# Patient Record
Sex: MALE | Race: WHITE | Employment: OTHER | URBAN - METROPOLITAN AREA
[De-identification: names, ages, dates, MRNs, and addresses within clinical notes are randomized per-mention and may not be internally consistent; named-entity substitution may affect disease eponyms.]

---

## 2017-01-05 ENCOUNTER — OFFICE VISIT (OUTPATIENT)
Dept: CARDIOLOGY CLINIC | Age: 82
End: 2017-01-05

## 2017-01-05 VITALS
SYSTOLIC BLOOD PRESSURE: 120 MMHG | HEART RATE: 68 BPM | WEIGHT: 170 LBS | OXYGEN SATURATION: 96 % | RESPIRATION RATE: 16 BRPM | DIASTOLIC BLOOD PRESSURE: 70 MMHG | HEIGHT: 65 IN | BODY MASS INDEX: 28.32 KG/M2

## 2017-01-05 DIAGNOSIS — J96.90 RESPIRATORY FAILURE, UNSPECIFIED CHRONICITY, UNSPECIFIED WHETHER WITH HYPOXIA OR HYPERCAPNIA (HCC): ICD-10-CM

## 2017-01-05 DIAGNOSIS — I10 ESSENTIAL HYPERTENSION, BENIGN: Chronic | ICD-10-CM

## 2017-01-05 DIAGNOSIS — I34.0 NON-RHEUMATIC MITRAL REGURGITATION: ICD-10-CM

## 2017-01-05 DIAGNOSIS — I48.0 PAROXYSMAL ATRIAL FIBRILLATION (HCC): Primary | Chronic | ICD-10-CM

## 2017-01-05 NOTE — PATIENT INSTRUCTIONS
Keep follow up as scheduled in April    Your oxygen level was 96% on room air so you can stop your oxygen

## 2017-01-05 NOTE — PROGRESS NOTES
Ankush Em     6/3/1923       Miguel Monae MD, [facility] [place]  Date of Visit-1/5/2017   PCP is Laila Hidalgo MD   [facility] [place]  [facility]  HPI:  Ankush Em is a 80 y.o. male     Patient with PAF. Was hospitalized around Christmas with respiratory failure with COPD. Did not have overt CHF. BNP was 249. Labs from [facility] with sodium of 139 and BUN of 19. He states he has returned to baseline health since leaving hospital. BP and heart rate dairy from [facility] is stable and within normal limits. Denies chest pain, edema, syncope or shortness of breath at rest, has no tachycardia, palpitations or sense of arrhythmia. Mr. Mary Tapia returns today. He denies any symptoms currently, but we discussed his admission to [facility] on [place] day, which is why he is here early for follow up. I have reviewed the cardiology progress note of Dr. Tawanna Parsons dated 12/24/16, indicating hypoxic acute failure with O2 sats of 88-91%, thinking that this patient may have some element of emphysema and respiratory failure, but he did not feel it was CHF. The EF was 50% and the PA pressure was 45. Patient also has known PAF and was controlled on Amiodarone just only once a week and was sent back to [facility]. Currently blood pressure is 120. Patient feels well. Records will be sent to Dr. Giselle Guerra, to include [facility]. Blood work was obtained 01/02/17, indicating sodium 139, BUN 19. Additionally vital signs are reported indicating a weight around 168, blood pressure in the 120s, and that record will be scanned to chart.        Assessment/Plan:     Hospital stay he was started on lasix and Metformin  PAF  -seems to be in sinus   -continue amiodarone and ASA  -no OAC due to fall risk     Respiratory failure  -improved  -EF normal at 50%  -did not seem to be CHF  -will check oxygen saturation today off oxygen    HTN  -well controlled    Follow up as scheduled in April   Future Appointments  Date Time Provider Yudith Pittman   4/6/2017 10:00 AM Judy Rivera  [address]      Patient Instructions   Keep follow up as scheduled in April    Your oxygen level was 96% on room air so you can stop your oxygen       Impression:   1. Paroxysmal atrial fibrillation (HCC)    2. Non-rheumatic mitral regurgitation    3. Essential hypertension, benign    4. Respiratory failure, unspecified chronicity, unspecified whether with hypoxia or hypercapnia ([address]       Cardiac History:   1. Atrial fibrillation  Echo 11-17-10 normal ef, mild to moderate MR,MARI  Cardioversion 8-26-10  2, Hypertension. 3. Dyslipidemia. 4. Elevated protein, serum globulin level. 5. Hyponatremia. 6. Hyperglycemia. 7. Pre-renal azotemia. 8. RASHEED and cardioversion 8/26/10, placed on Amiodarone. 9. History of spontaneous pneumothorax. REVIEW OF SYSTEMS: Insomnia, depression, anemia, hernia repair. SOCIAL HISTORY: He is a non-smoker, . ROS-except as noted above. . A complete cardiac and respiratory are reviewed and negative except as above ; Resp-denies wheezing  or productive cough,.  Const- No unusual weight loss or fever; Neuro-no recent seizure or CVA ; GI- No BRBPR, abdom pain, bloating ; - no  hematuria   see supplement sheet, initialed and to be scanned by staff  Past Medical History   Diagnosis Date    Anemia NEC     CAD (coronary artery disease)     CHF (congestive heart failure) ([address]     Depression     Diabetes ([address]      diet controlled    DJD (degenerative joint disease) of knee 11/8/2012    GERD (gastroesophageal reflux disease)     High cholesterol     Hip fracture requiring operative repair ([address] 5/30/2015    History of atrial fibrillation      Successful cardioversion to NSR 8/2010    Hypercholesterolemia     Hypertension     Mitral regurgitation     Near syncope 7/27/2013    Psychiatric disorder     Shortness of breath 12/24/2016      Social Hx= reports that he has never smoked. He has never used smokeless tobacco. He reports that he does not drink alcohol or use illicit drugs. Exam and Labs:    Visit Vitals    /70 (BP 1 Location: Left arm, BP Patient Position: Sitting)    Pulse 68    Resp 16    Ht 5' 5\" (1.651 m)    Wt 170 lb (77.1 kg)    SpO2 97%    BMI 28.29 kg/m2      Constitutional:  NAD, comfortable  Head: NC,AT. Eyes: No scleral icterus. Neck:  Neck supple. No JVD present. Throat: moist mucous membranes. Chest: Effort normal & normal respiratory excursion . Neurological: alert, conversant and oriented . Skin: Skin is not cold. No obvious systemic rash noted. Not diaphoretic. No erythema. Psychiatric:  Grossly normal mood and affect. Behavior appears normal. Extremities:  no clubbing or cyanosis. Abdomen: non distended    Lungs:breath sounds normal. No stridor. distress, wheezes or  Rales. Heart:    normal rate, regular rhythm, normal S1, S2, 2/6 apical murmur, rubs, clicks or gallops , PMI non displaced. Edema: Edema is none. Lab Results   Component Value Date/Time    Cholesterol, total 152 07/28/2013 02:00 AM    HDL Cholesterol 45 07/28/2013 02:00 AM    LDL, calculated 80 07/28/2013 02:00 AM    Triglyceride 135 07/28/2013 02:00 AM    CHOL/HDL Ratio 3.4 07/28/2013 02:00 AM     No results found for this or any previous visit.    Lab Results   Component Value Date/Time    Sodium 137 12/27/2016 04:21 AM    Potassium 4.1 12/27/2016 04:21 AM    Chloride 101 12/27/2016 04:21 AM    CO2 27 12/27/2016 04:21 AM    Anion gap 9 12/27/2016 04:21 AM    Glucose 156 12/27/2016 04:21 AM    BUN 20 12/27/2016 04:21 AM    Creatinine 0.99 12/27/2016 04:21 AM    BUN/Creatinine ratio 20 12/27/2016 04:21 AM    GFR est AA >60 12/27/2016 04:21 AM    GFR est non-AA >60 12/27/2016 04:21 AM    Calcium 9.7 12/27/2016 04:21 AM      Wt Readings from Last 3 Encounters:   01/05/17 170 lb (77.1 kg)   12/27/16 147 lb 4.3 oz (66.8 kg)   10/06/16 177 lb (80.3 kg)      BP Readings from Last 3 Encounters:   01/05/17 120/70   12/27/16 140/82   10/06/16 130/60        Current Outpatient Prescriptions   Medication Sig    metFORMIN ER (GLUCOPHAGE XR) 500 mg tablet Take 1 Tab by mouth daily (with dinner).  albuterol-ipratropium (DUO-NEB) 2.5 mg-0.5 mg/3 ml nebu 3 mL by Nebulization route every six (6) hours as needed.  furosemide (LASIX) 20 mg tablet Take 1 Tab by mouth daily.  aspirin delayed-release 81 mg tablet Take 81 mg by mouth.  ferrous sulfate 325 mg (65 mg iron) tablet Take 325 mg by mouth two (2) times a day.  polyethylene glycol (MIRALAX) 17 gram packet Take 17 g by mouth daily.  nystatin (MYCOSTATIN) topical cream Apply 1 g to affected area as needed for Skin Irritation. Apply to b/l groin for redness/irritation    amiodarone (CORDARONE) 200 mg tablet Take 1 Tab by mouth every Monday.  tamsulosin (FLOMAX) 0.4 mg capsule Take 0.4 mg by mouth daily.  mirabegron ER (MYRBETRIQ) 25 mg ER tablet Take 25 mg by mouth nightly.  senna-docusate (PERICOLACE) 8.6-50 mg per tablet Take 1 Tab by mouth nightly. (Patient taking differently: Take 1 Tab by mouth nightly as needed for Constipation.)    propylene glycol-glycerin (ARTIFICIAL TEARS,GLYCERIN-PEG,) 1-0.3 % drop Apply 1 Drop to eye four (4) times daily as needed (Dry Eyes).  bisacodyl (DULCOLAX) 5 mg EC tablet Take 5 mg by mouth daily as needed for Constipation.  olopatadine (PATADAY) 0.2 % drop ophthalmic solution Administer 1 Drop to both eyes as needed.  acetaminophen (TYLENOL) 325 mg tablet Take 650 mg by mouth every four (4) hours as needed for Pain.  loratadine (CLARITIN) 10 mg tablet Take 10 mg by mouth daily.  ergocalciferol (VITAMIN D2) 50,000 unit capsule Take 50,000 Units by mouth every seven (7) days. TAKEN ON THURSDAY'S    VIT C/E/ZN/COPPR/LUTEIN/ZEAXAN (PRESERVISION AREDS 2 PO) Take  by mouth daily.     venlafaxine-SR (EFFEXOR XR) 75 mg capsule Take 75 mg by mouth daily.  simvastatin (ZOCOR) 10 mg tablet Take 1 Tab by mouth nightly.  omeprazole (PRILOSEC) 20 mg capsule Take 1 Cap by mouth daily.  cyanocobalamin (VITAMIN B12) 1,000 mcg/mL injection INJECT 0.5ML (500MCG) IM EVERY 30 DAYS (16th)     No current facility-administered medications for this visit. Impression see above.     Written by Ella Hemphill, as dictated by Rj Gutierrez MD.

## 2017-01-05 NOTE — MR AVS SNAPSHOT
Visit Information Date & Time Provider Department Dept. Phone Encounter #  
 1/5/2017  1:40 PM Ana Persaud MD [facility] [phone] [id number] Your Appointments 4/6/2017 10:00 AM  
ESTABLISHED PATIENT with Ana Persaud MD  
[facility] (JOSE SCHEDULING) Appt Note: 6 month follow up per dr [address]  
[address] [facility] [id number] Upcoming Health Maintenance Date Due MICROALBUMIN Q1 6/3/1933 EYE EXAM RETINAL OR DILATED Q1 6/3/1933 DTaP/Tdap/Td series (1 - Tdap) 6/3/1944 ZOSTER VACCINE AGE 60> 6/3/1983 GLAUCOMA SCREENING Q2Y 6/3/1988 MEDICARE YEARLY EXAM 6/3/1988 LIPID PANEL Q1 7/28/2014 Pneumococcal 65+ Low/Medium Risk (2 of 2 - PPSV23) 10/20/2014 FOOT EXAM Q1 4/29/2016 INFLUENZA AGE 9 TO ADULT 8/1/2016 HEMOGLOBIN A1C Q6M 6/25/2017 Allergies as of 1/5/2017  Review Complete On: 1/5/2017 By: Ana Persaud MD  
 No Known Allergies Current Immunizations  Reviewed on 6/2/2015 Name Date Influenza Vaccine 10/1/2014 Influenza Vaccine Whole 10/20/2011 Pneumococcal Vaccine (Unspecified Type) 10/20/2009 TB Skin Test (PPD) 3/12/2013 Not reviewed this visit Vitals BP Pulse Resp Height(growth percentile) Weight(growth percentile) SpO2  
 120/70 (BP 1 Location: Left arm, BP Patient Position: Sitting) 68 16 5' 5\" (1.651 m) 170 lb (77.1 kg) 96% BMI Smoking Status 28.29 kg/m2 Never Smoker Vitals History BMI and BSA Data Body Mass Index Body Surface Area  
 28.29 kg/m 2 1.88 m 2 Preferred Pharmacy Pharmacy Name Phone [facility] [phone] Your Updated Medication List  
  
   
This list is accurate as of: 1/5/17  2:47 PM.  Always use your most recent med list.  
  
 acetaminophen 325 mg tablet Commonly known as:  TYLENOL Take 650 mg by mouth every four (4) hours as needed for Pain. albuterol-ipratropium 2.5 mg-0.5 mg/3 ml Nebu Commonly known as:  DUO-NEB  
3 mL by Nebulization route every six (6) hours as needed. amiodarone 200 mg tablet Commonly known as:  CORDARONE Take 1 Tab by mouth every Monday. Artificial Tears(Glycerin-PEG) 1-0.3 % Drop Generic drug:  propylene glycol-glycerin Apply 1 Drop to eye four (4) times daily as needed (Dry Eyes). aspirin delayed-release 81 mg tablet Take 81 mg by mouth.  
  
 bisacodyl 5 mg EC tablet Commonly known as:  DULCOLAX Take 5 mg by mouth daily as needed for Constipation. CLARITIN 10 mg tablet Generic drug:  loratadine Take 10 mg by mouth daily. cyanocobalamin 1,000 mcg/mL injection Commonly known as:  VITAMIN B12 INJECT 0.5ML (500MCG) IM EVERY 30 DAYS (16th) EFFEXOR XR 75 mg capsule Generic drug:  venlafaxine-SR Take 75 mg by mouth daily. ferrous sulfate 325 mg (65 mg iron) tablet Take 325 mg by mouth two (2) times a day. furosemide 20 mg tablet Commonly known as:  LASIX Take 1 Tab by mouth daily. metFORMIN  mg tablet Commonly known as:  GLUCOPHAGE XR Take 1 Tab by mouth daily (with dinner). mirabegron ER 25 mg ER tablet Commonly known as:  MYRBETRIQ Take 25 mg by mouth nightly. nystatin topical cream  
Commonly known as:  MYCOSTATIN Apply 1 g to affected area as needed for Skin Irritation. Apply to b/l groin for redness/irritation  
  
 omeprazole 20 mg capsule Commonly known as:  PriLOSEC Take 1 Cap by mouth daily. PATADAY 0.2 % Drop ophthalmic solution Generic drug:  olopatadine Administer 1 Drop to both eyes as needed. polyethylene glycol 17 gram packet Commonly known as:  Kreg Patron Take 17 g by mouth daily. PRESERVISION AREDS 2 PO Take  by mouth daily. senna-docusate 8.6-50 mg per tablet Commonly known as:  Rick Spikes Take 1 Tab by mouth nightly. simvastatin 10 mg tablet Commonly known as:  ZOCOR Take 1 Tab by mouth nightly. tamsulosin 0.4 mg capsule Commonly known as:  FLOMAX Take 0.4 mg by mouth daily. VITAMIN D2 50,000 unit capsule Generic drug:  ergocalciferol Take 50,000 Units by mouth every seven (7) days. TAKEN ON THURSDAY'S Patient Instructions Keep follow up as scheduled in April Your oxygen level was 96% on room air so you can stop your oxygen Introducing [facility]! Smiley Aragon introduces [employer] patient portal. Now you can access parts of your medical record, email your doctor's office, and request medication refills online. 1. In your internet browser, go to [email or web address] 2. Click on the First Time User? Click Here link in the Sign In box. You will see the New Member Sign Up page. 3. Enter your [employer] Access Code exactly as it appears below. You will not need to use this code after youve completed the sign-up process. If you do not sign up before the expiration date, you must request a new code. · [employer] Access Code: [email or web address] Expires: 3/24/2017 11:37 AM 
 
4. Enter the last four digits of your Social Security Number (xxxx) and Date of Birth (mm/dd/yyyy) as indicated and click Submit. You will be taken to the next sign-up page. 5. Create a [employer] ID. This will be your [employer] login ID and cannot be changed, so think of one that is secure and easy to remember. 6. Create a [employer] password. You can change your password at any time. 7. Enter your Password Reset Question and Answer. This can be used at a later time if you forget your password. 8. Enter your e-mail address. You will receive e-mail notification when new information is available in [address] 9. Click Sign Up. You can now view and download portions of your medical record. 10. Click the Download Summary menu link to download a portable copy of your medical information. If you have questions, please visit the Frequently Asked Questions section of the [employer] website. Remember, [employer] is NOT to be used for urgent needs. For medical emergencies, dial 911. Now available from your iPhone and Android! Please provide this summary of care documentation to your next provider. Your primary care clinician is listed as Estela Lantigua. If you have any questions after today's visit, please call [phone].

## 2017-04-06 ENCOUNTER — OFFICE VISIT (OUTPATIENT)
Dept: CARDIOLOGY CLINIC | Age: 82
End: 2017-04-06

## 2017-04-06 VITALS
BODY MASS INDEX: 28.32 KG/M2 | OXYGEN SATURATION: 90 % | RESPIRATION RATE: 16 BRPM | WEIGHT: 170 LBS | HEIGHT: 65 IN | DIASTOLIC BLOOD PRESSURE: 70 MMHG | HEART RATE: 70 BPM | SYSTOLIC BLOOD PRESSURE: 120 MMHG

## 2017-04-06 DIAGNOSIS — I48.0 PAROXYSMAL ATRIAL FIBRILLATION (HCC): Primary | Chronic | ICD-10-CM

## 2017-04-06 DIAGNOSIS — R09.02 HYPOXIA: ICD-10-CM

## 2017-04-06 DIAGNOSIS — E78.00 HYPERCHOLESTEROLEMIA: ICD-10-CM

## 2017-04-06 DIAGNOSIS — I10 ESSENTIAL HYPERTENSION, BENIGN: Chronic | ICD-10-CM

## 2017-04-06 DIAGNOSIS — I34.0 NON-RHEUMATIC MITRAL REGURGITATION: ICD-10-CM

## 2017-04-06 NOTE — PROGRESS NOTES
Dale Leblanc     6/3/1923       Miguel Lozada MD, [facility] [place]  Date of Visit-4/6/2017   PCP is Jacobo Bernabe MD   [facility] [place]  [facility]  HPI:  Dale Leblanc is a 80 y.o. male     Follow up of PAF. Resident of [facility]. At [place] had respiratory failure with COPD with EF of 50%. He is on no OAC due to fall risk. He has been doing well since January. He remains active and is able to walk daily. Denies chest pain, edema, syncope or shortness of breath at rest, has no tachycardia, palpitations or sense of arrhythmia. EKG: AF with RBBB at rate of 70 bpm     Assessment/Plan:       PAF  -EKG today shows AF with RBBB so he is back in AF  -this is likely to be persistent at his age   -the rate is controlled  -go ahead and stop the diltiazem  -probably will need 24 hour Holter to figure out what his heart rate variability really is   -no OAC due to fall risk  -continue ASA    HTN  -well controlled   Lipids-on statin, i dont stop for age in ambulatory patients    Hypoxia   -we stopped O2 last visit   -oxygen count 90-93 sitting   -tried to walk him and O2 dropped to 88 even with just standing   -he does not have his leg brace so he could not walk   -I suggest that his physician at [facility] evaluate him for oxygen at night or exertion so we'll leave the details up to [facility]     Since we are stopping the amiodarone will need Holter to evaluate heart rhythm     Stable mitral valve disease    Follow up in 6 months      Impression:   1. Paroxysmal atrial fibrillation (HCC)    2. Hypoxia    3. Non-rheumatic mitral regurgitation    4. Essential hypertension, benign    5. Hypercholesterolemia       Cardiac History:   1. Atrial fibrillation  Echo 11-17-10 normal ef, mild to moderate MR,MARI  Cardioversion 8-26-10  2, Hypertension. 3. Dyslipidemia. 4. Elevated protein, serum globulin level. 5. Hyponatremia. 6. Hyperglycemia.   7. Pre-renal azotemia. 8. RASHEED and cardioversion 8/26/10, placed on Amiodarone. 9. History of spontaneous pneumothorax. REVIEW OF SYSTEMS: Insomnia, depression, anemia, hernia repair. SOCIAL HISTORY: He is a non-smoker, . ROS-except as noted above. . A complete cardiac and respiratory are reviewed and negative except as above ; Resp-denies wheezing  or productive cough,. Const- No unusual weight loss or fever; Neuro-no recent seizure or CVA ; GI- No BRBPR, abdom pain, bloating ; - no  hematuria   see supplement sheet, initialed and to be scanned by staff  Past Medical History:   Diagnosis Date    Anemia NEC     CAD (coronary artery disease)     [facility]est[facility]) [facility]     Depression     Diabetes ([facility]     diet controlled    DJD (degenerative joint disease) of knee 11/8/2012    GERD (gastroesophageal reflux disease)     High cholesterol     Hip fracture requiring operative repair ([facility] 5/30/2015    History of atrial fibrillation     Successful cardioversion to NSR 8/2010    Hypercholesterolemia     Hypertension     Mitral regurgitation     Near syncope 7/27/2013    Psychiatric disorder     Shortness of breath 12/24/2016      Social Hx= reports that he has never smoked. He has never used smokeless tobacco. He reports that he does not drink alcohol or use illicit drugs. Exam and Labs:    Visit Vitals    /70 (BP 1 Location: Left arm, BP Patient Position: Sitting)    Pulse 70    Resp 16    Ht 5' 5\" (1.651 m)    Wt 170 lb (77.1 kg)    SpO2 90%    BMI 28.29 kg/m2      Constitutional:  NAD, comfortable  Head: NC,AT. Eyes: No scleral icterus. Neck:  Neck supple. No JVD present. Throat: moist mucous membranes. Chest: Effort normal & normal respiratory excursion . Neurological: alert, conversant and oriented . Skin: Skin is not cold. No obvious systemic rash noted. Not diaphoretic. No erythema. Psychiatric:  Grossly normal mood and affect.   Behavior appears normal. Extremities:  no clubbing or cyanosis. Abdomen: non distended    Lungs:breath sounds normal. No stridor. distress, wheezes or  Rales. Heart:    normal rate, regular rhythm, normal S1, S2, no murmurs, rubs, clicks or gallops , PMI non displaced. Edema: Edema is none. No results found for this or any previous visit. Lab Results   Component Value Date/Time    Sodium 137 12/27/2016 04:21 AM    Potassium 4.1 12/27/2016 04:21 AM    Chloride 101 12/27/2016 04:21 AM    CO2 27 12/27/2016 04:21 AM    Anion gap 9 12/27/2016 04:21 AM    Glucose 156 12/27/2016 04:21 AM    BUN 20 12/27/2016 04:21 AM    Creatinine 0.99 12/27/2016 04:21 AM    BUN/Creatinine ratio 20 12/27/2016 04:21 AM    GFR est AA >60 12/27/2016 04:21 AM    GFR est non-AA >60 12/27/2016 04:21 AM    Calcium 9.7 12/27/2016 04:21 AM      Wt Readings from Last 3 Encounters:   04/06/17 170 lb (77.1 kg)   01/05/17 170 lb (77.1 kg)   12/27/16 147 lb 4.3 oz (66.8 kg)      BP Readings from Last 3 Encounters:   04/06/17 120/70   01/05/17 120/70   12/27/16 140/82        Current Outpatient Prescriptions   Medication Sig    metFORMIN ER (GLUCOPHAGE XR) 500 mg tablet Take 1 Tab by mouth daily (with dinner).  albuterol-ipratropium (DUO-NEB) 2.5 mg-0.5 mg/3 ml nebu 3 mL by Nebulization route every six (6) hours as needed.  furosemide (LASIX) 20 mg tablet Take 1 Tab by mouth daily.  aspirin delayed-release 81 mg tablet Take 81 mg by mouth.  ferrous sulfate 325 mg (65 mg iron) tablet Take 325 mg by mouth two (2) times a day.  polyethylene glycol (MIRALAX) 17 gram packet Take 17 g by mouth daily.  nystatin (MYCOSTATIN) topical cream Apply 1 g to affected area as needed for Skin Irritation. Apply to b/l groin for redness/irritation    tamsulosin (FLOMAX) 0.4 mg capsule Take 0.4 mg by mouth daily.  mirabegron ER (MYRBETRIQ) 25 mg ER tablet Take 25 mg by mouth nightly.  senna-docusate (PERICOLACE) 8.6-50 mg per tablet Take 1 Tab by mouth nightly. (Patient taking differently: Take 1 Tab by mouth nightly as needed for Constipation.)    propylene glycol-glycerin (ARTIFICIAL TEARS,GLYCERIN-PEG,) 1-0.3 % drop Apply 1 Drop to eye four (4) times daily as needed (Dry Eyes).  bisacodyl (DULCOLAX) 5 mg EC tablet Take 5 mg by mouth daily as needed for Constipation.  olopatadine (PATADAY) 0.2 % drop ophthalmic solution Administer 1 Drop to both eyes as needed.  acetaminophen (TYLENOL) 325 mg tablet Take 650 mg by mouth every four (4) hours as needed for Pain.  loratadine (CLARITIN) 10 mg tablet Take 10 mg by mouth daily.  ergocalciferol (VITAMIN D2) 50,000 unit capsule Take 50,000 Units by mouth every seven (7) days. TAKEN ON THURSDAY'S    VIT C/E/ZN/COPPR/LUTEIN/ZEAXAN (PRESERVISION AREDS 2 PO) Take  by mouth daily.  venlafaxine-SR (EFFEXOR XR) 75 mg capsule Take 75 mg by mouth daily.  cyanocobalamin (VITAMIN B12) 1,000 mcg/mL injection INJECT 0.5ML (500MCG) IM EVERY 30 DAYS (16th)    simvastatin (ZOCOR) 10 mg tablet Take 1 Tab by mouth nightly.  omeprazole (PRILOSEC) 20 mg capsule Take 1 Cap by mouth daily. No current facility-administered medications for this visit. Impression see above.     Written by Keith Gray, as dictated by Louetta Nyhan, MD.

## 2017-04-06 NOTE — PATIENT INSTRUCTIONS
Stop Amiodarone    Obtain a 24 hour holter monitor in 1 month, we will call you with results    Dr. Lizzeth Marie recommends the attending physician restart Oxygen with activity and at night    Follow up with Dr. Lizzeth Marie in 6 months

## 2017-04-06 NOTE — MR AVS SNAPSHOT
Visit Information Date & Time Provider Department Dept. Phone Encounter #  
 4/6/2017 10:00 AM Jessica Ramirez MD [facility] [phone] [id number] Upcoming Health Maintenance Date Due MICROALBUMIN Q1 6/3/1933 EYE EXAM RETINAL OR DILATED Q1 6/3/1933 DTaP/Tdap/Td series (1 - Tdap) 6/3/1944 ZOSTER VACCINE AGE 60> 6/3/1983 GLAUCOMA SCREENING Q2Y 6/3/1988 MEDICARE YEARLY EXAM 6/3/1988 LIPID PANEL Q1 7/28/2014 Pneumococcal 65+ Low/Medium Risk (2 of 2 - PPSV23) 10/20/2014 FOOT EXAM Q1 4/29/2016 INFLUENZA AGE 9 TO ADULT 8/1/2016 HEMOGLOBIN A1C Q6M 6/25/2017 Allergies as of 4/6/2017  Review Complete On: 4/6/2017 By: Soraya July No Known Allergies Current Immunizations  Reviewed on 6/2/2015 Name Date Influenza Vaccine 10/1/2014 Influenza Vaccine Whole 10/20/2011 Pneumococcal Vaccine (Unspecified Type) 10/20/2009 TB Skin Test (PPD) 3/12/2013 Not reviewed this visit You Were Diagnosed With   
  
 Codes Comments Paroxysmal atrial fibrillation (HCC)    -  Primary ICD-10-CM: I48.0 ICD-9-CM: 427.31 Vitals BP Pulse Resp Height(growth percentile) Weight(growth percentile) SpO2  
 120/70 (BP 1 Location: Left arm, BP Patient Position: Sitting) 70 16 5' 5\" (1.651 m) 170 lb (77.1 kg) 90% BMI Smoking Status 28.29 kg/m2 Never Smoker Vitals History BMI and BSA Data Body Mass Index Body Surface Area  
 28.29 kg/m 2 1.88 m 2 Preferred Pharmacy Pharmacy Name Phone [facility] [phone] Your Updated Medication List  
  
   
This list is accurate as of: 4/6/17 11:07 AM.  Always use your most recent med list.  
  
  
  
  
 acetaminophen 325 mg tablet Commonly known as:  TYLENOL Take 650 mg by mouth every four (4) hours as needed for Pain. albuterol-ipratropium 2.5 mg-0.5 mg/3 ml Nebu Commonly known as:  DUO-NEB  
3 mL by Nebulization route every six (6) hours as needed. Artificial Tears(Glycerin-PEG) 1-0.3 % Drop Generic drug:  propylene glycol-glycerin Apply 1 Drop to eye four (4) times daily as needed (Dry Eyes). aspirin delayed-release 81 mg tablet Take 81 mg by mouth.  
  
 bisacodyl 5 mg EC tablet Commonly known as:  DULCOLAX Take 5 mg by mouth daily as needed for Constipation. CLARITIN 10 mg tablet Generic drug:  loratadine Take 10 mg by mouth daily. cyanocobalamin 1,000 mcg/mL injection Commonly known as:  VITAMIN B12 INJECT 0.5ML (500MCG) IM EVERY 30 DAYS (16th) EFFEXOR XR 75 mg capsule Generic drug:  venlafaxine-SR Take 75 mg by mouth daily. ferrous sulfate 325 mg (65 mg iron) tablet Take 325 mg by mouth two (2) times a day. furosemide 20 mg tablet Commonly known as:  LASIX Take 1 Tab by mouth daily. metFORMIN  mg tablet Commonly known as:  GLUCOPHAGE XR Take 1 Tab by mouth daily (with dinner). mirabegron ER 25 mg ER tablet Commonly known as:  MYRBETRIQ Take 25 mg by mouth nightly. nystatin topical cream  
Commonly known as:  MYCOSTATIN Apply 1 g to affected area as needed for Skin Irritation. Apply to b/l groin for redness/irritation  
  
 omeprazole 20 mg capsule Commonly known as:  PriLOSEC Take 1 Cap by mouth daily. PATADAY 0.2 % Drop ophthalmic solution Generic drug:  olopatadine Administer 1 Drop to both eyes as needed. polyethylene glycol 17 gram packet Commonly known as:  Laith Hoops Take 17 g by mouth daily. PRESERVISION AREDS 2 PO Take  by mouth daily. senna-docusate 8.6-50 mg per tablet Commonly known as:  Parul Dickey Take 1 Tab by mouth nightly. simvastatin 10 mg tablet Commonly known as:  ZOCOR Take 1 Tab by mouth nightly. tamsulosin 0.4 mg capsule Commonly known as:  FLOMAX Take 0.4 mg by mouth daily. VITAMIN D2 50,000 unit capsule Generic drug:  ergocalciferol Take 50,000 Units by mouth every seven (7) days. TAKEN ON THURSDAY'S We Performed the Following AMB POC EKG ROUTINE W/ 12 LEADS, INTER & REP [11795 CPT(R)] To-Do List   
 04/06/2017 ECG:  CARDIAC HOLTER MONITOR, 24 HOURS Patient Instructions Stop Amiodarone Obtain a 24 hour holter monitor in 1 month, we will call you with results Dr. Bryanna Costa recommends the attending physician restart Oxygen with activity and at night Follow up with Dr. Bryanna Costa in 6 months Introducing [facility]! Kamryn Cleverly introduces [employer] patient portal. Now you can access parts of your medical record, email your doctor's office, and request medication refills online. 1. In your internet browser, go to [email or web address] 2. Click on the First Time User? Click Here link in the Sign In box. You will see the New Member Sign Up page. 3. Enter your [employer] Access Code exactly as it appears below. You will not need to use this code after youve completed the sign-up process. If you do not sign up before the expiration date, you must request a new code. · [employer] Access Code: [email or web address] Expires: 7/5/2017 10:29 AM 
 
4. Enter the last four digits of your Social Security Number (xxxx) and Date of Birth (mm/dd/yyyy) as indicated and click Submit. You will be taken to the next sign-up page. 5. Create a [employer] ID. This will be your [employer] login ID and cannot be changed, so think of one that is secure and easy to remember. 6. Create a [employer] password. You can change your password at any time. 7. Enter your Password Reset Question and Answer. This can be used at a later time if you forget your password. 8. Enter your e-mail address. You will receive e-mail notification when new information is available in [address]. 9. Click Sign Up.  You can now view and download portions of your medical record. 10. Click the Download Summary menu link to download a portable copy of your medical information. If you have questions, please visit the Frequently Asked Questions section of the [employer] website. Remember, [employer] is NOT to be used for urgent needs. For medical emergencies, dial 911. Now available from your iPhone and Android! Please provide this summary of care documentation to your next provider. Your primary care clinician is listed as [facility]. If you have any questions after today's visit, please call [phone].

## 2017-04-08 PROBLEM — R09.02 HYPOXIA: Status: ACTIVE | Noted: 2017-04-08

## 2017-05-08 ENCOUNTER — CLINICAL SUPPORT (OUTPATIENT)
Dept: CARDIOLOGY CLINIC | Age: 82
End: 2017-05-08

## 2017-05-08 ENCOUNTER — DOCUMENTATION ONLY (OUTPATIENT)
Dept: CARDIOLOGY CLINIC | Age: 82
End: 2017-05-08

## 2017-05-08 DIAGNOSIS — I48.0 PAROXYSMAL ATRIAL FIBRILLATION (HCC): Chronic | ICD-10-CM

## 2017-05-08 NOTE — PROGRESS NOTES
Dariela Seqeuira came in today for a 24hr holter monitor. Monitor was placed and patient stated understanding. Patient wanted his son Fausto Becrera) to be advised of his heart monitor. Called both numbers that were provided with no answer. No message was left due to no identification on answering machine.

## 2017-05-16 ENCOUNTER — DOCUMENTATION ONLY (OUTPATIENT)
Dept: CARDIOLOGY CLINIC | Age: 82
End: 2017-05-16